# Patient Record
Sex: MALE | Race: NATIVE HAWAIIAN OR OTHER PACIFIC ISLANDER | HISPANIC OR LATINO | URBAN - METROPOLITAN AREA
[De-identification: names, ages, dates, MRNs, and addresses within clinical notes are randomized per-mention and may not be internally consistent; named-entity substitution may affect disease eponyms.]

---

## 2024-04-16 ENCOUNTER — EMERGENCY (EMERGENCY)
Facility: HOSPITAL | Age: 19
LOS: 0 days | Discharge: HOME | End: 2024-04-17
Attending: PEDIATRICS | Admitting: PEDIATRICS
Payer: MEDICAID

## 2024-04-16 VITALS
TEMPERATURE: 99 F | SYSTOLIC BLOOD PRESSURE: 106 MMHG | OXYGEN SATURATION: 98 % | DIASTOLIC BLOOD PRESSURE: 58 MMHG | RESPIRATION RATE: 18 BRPM | HEART RATE: 64 BPM | WEIGHT: 152.12 LBS

## 2024-04-16 DIAGNOSIS — R22.0 LOCALIZED SWELLING, MASS AND LUMP, HEAD: ICD-10-CM

## 2024-04-16 DIAGNOSIS — K08.89 OTHER SPECIFIED DISORDERS OF TEETH AND SUPPORTING STRUCTURES: ICD-10-CM

## 2024-04-16 DIAGNOSIS — H53.8 OTHER VISUAL DISTURBANCES: ICD-10-CM

## 2024-04-16 LAB
ALBUMIN SERPL ELPH-MCNC: 4.7 G/DL — SIGNIFICANT CHANGE UP (ref 3.5–5.2)
ALP SERPL-CCNC: 83 U/L — SIGNIFICANT CHANGE UP (ref 30–115)
ALT FLD-CCNC: 13 U/L — SIGNIFICANT CHANGE UP (ref 13–38)
ANION GAP SERPL CALC-SCNC: 12 MMOL/L — SIGNIFICANT CHANGE UP (ref 7–14)
AST SERPL-CCNC: 17 U/L — SIGNIFICANT CHANGE UP (ref 13–38)
BASOPHILS # BLD AUTO: 0.05 K/UL — SIGNIFICANT CHANGE UP (ref 0–0.2)
BASOPHILS NFR BLD AUTO: 0.8 % — SIGNIFICANT CHANGE UP (ref 0–1)
BILIRUB SERPL-MCNC: 0.9 MG/DL — SIGNIFICANT CHANGE UP (ref 0.2–1.2)
BUN SERPL-MCNC: 11 MG/DL — SIGNIFICANT CHANGE UP (ref 10–20)
CALCIUM SERPL-MCNC: 9.2 MG/DL — SIGNIFICANT CHANGE UP (ref 8.4–10.4)
CHLORIDE SERPL-SCNC: 107 MMOL/L — SIGNIFICANT CHANGE UP (ref 98–110)
CO2 SERPL-SCNC: 22 MMOL/L — SIGNIFICANT CHANGE UP (ref 17–32)
CREAT SERPL-MCNC: 0.6 MG/DL — LOW (ref 0.7–1.5)
EGFR: 142 ML/MIN/1.73M2 — SIGNIFICANT CHANGE UP
EOSINOPHIL # BLD AUTO: 0.19 K/UL — SIGNIFICANT CHANGE UP (ref 0–0.7)
EOSINOPHIL NFR BLD AUTO: 3 % — SIGNIFICANT CHANGE UP (ref 0–8)
GLUCOSE SERPL-MCNC: 84 MG/DL — SIGNIFICANT CHANGE UP (ref 70–99)
HCT VFR BLD CALC: 43.3 % — SIGNIFICANT CHANGE UP (ref 42–52)
HGB BLD-MCNC: 14.7 G/DL — SIGNIFICANT CHANGE UP (ref 14–18)
IMM GRANULOCYTES NFR BLD AUTO: 0.3 % — SIGNIFICANT CHANGE UP (ref 0.1–0.3)
LYMPHOCYTES # BLD AUTO: 2.26 K/UL — SIGNIFICANT CHANGE UP (ref 1.2–3.4)
LYMPHOCYTES # BLD AUTO: 35.5 % — SIGNIFICANT CHANGE UP (ref 20.5–51.1)
MCHC RBC-ENTMCNC: 30.1 PG — SIGNIFICANT CHANGE UP (ref 27–31)
MCHC RBC-ENTMCNC: 33.9 G/DL — SIGNIFICANT CHANGE UP (ref 32–37)
MCV RBC AUTO: 88.7 FL — SIGNIFICANT CHANGE UP (ref 80–94)
MONOCYTES # BLD AUTO: 0.58 K/UL — SIGNIFICANT CHANGE UP (ref 0.1–0.6)
MONOCYTES NFR BLD AUTO: 9.1 % — SIGNIFICANT CHANGE UP (ref 1.7–9.3)
NEUTROPHILS # BLD AUTO: 3.27 K/UL — SIGNIFICANT CHANGE UP (ref 1.4–6.5)
NEUTROPHILS NFR BLD AUTO: 51.3 % — SIGNIFICANT CHANGE UP (ref 42.2–75.2)
NRBC # BLD: 0 /100 WBCS — SIGNIFICANT CHANGE UP (ref 0–0)
PLATELET # BLD AUTO: 216 K/UL — SIGNIFICANT CHANGE UP (ref 130–400)
PMV BLD: 9.7 FL — SIGNIFICANT CHANGE UP (ref 7.4–10.4)
POTASSIUM SERPL-MCNC: 3.9 MMOL/L — SIGNIFICANT CHANGE UP (ref 3.5–5)
POTASSIUM SERPL-SCNC: 3.9 MMOL/L — SIGNIFICANT CHANGE UP (ref 3.5–5)
PROT SERPL-MCNC: 7 G/DL — SIGNIFICANT CHANGE UP (ref 6–8)
RBC # BLD: 4.88 M/UL — SIGNIFICANT CHANGE UP (ref 4.7–6.1)
RBC # FLD: 13.6 % — SIGNIFICANT CHANGE UP (ref 11.5–14.5)
SODIUM SERPL-SCNC: 141 MMOL/L — SIGNIFICANT CHANGE UP (ref 135–146)
WBC # BLD: 6.37 K/UL — SIGNIFICANT CHANGE UP (ref 4.8–10.8)
WBC # FLD AUTO: 6.37 K/UL — SIGNIFICANT CHANGE UP (ref 4.8–10.8)

## 2024-04-16 PROCEDURE — 36415 COLL VENOUS BLD VENIPUNCTURE: CPT

## 2024-04-16 PROCEDURE — D0220: CPT

## 2024-04-16 PROCEDURE — 96374 THER/PROPH/DIAG INJ IV PUSH: CPT | Mod: XU

## 2024-04-16 PROCEDURE — 85025 COMPLETE CBC W/AUTO DIFF WBC: CPT

## 2024-04-16 PROCEDURE — 70487 CT MAXILLOFACIAL W/DYE: CPT | Mod: MC

## 2024-04-16 PROCEDURE — 99284 EMERGENCY DEPT VISIT MOD MDM: CPT

## 2024-04-16 PROCEDURE — T1013: CPT

## 2024-04-16 PROCEDURE — 96376 TX/PRO/DX INJ SAME DRUG ADON: CPT | Mod: XU

## 2024-04-16 PROCEDURE — 96365 THER/PROPH/DIAG IV INF INIT: CPT

## 2024-04-16 PROCEDURE — 96366 THER/PROPH/DIAG IV INF ADDON: CPT

## 2024-04-16 PROCEDURE — D0140: CPT

## 2024-04-16 PROCEDURE — 80053 COMPREHEN METABOLIC PANEL: CPT

## 2024-04-16 RX ADMIN — Medication 100 MILLIGRAM(S): at 22:55

## 2024-04-16 RX ADMIN — Medication 100 MILLIGRAM(S): at 16:26

## 2024-04-16 NOTE — CONSULT NOTE ADULT - SUBJECTIVE AND OBJECTIVE BOX
18M with no significant PMH presenting to ED with 10 days of left lower dental pain, 1 day of left sided facial swelling. Denies changes to vision, odynophagia, dysphagia, and respiratory distress.     Vitals reviewed: stable    Physical Exam:    Gen: NAD, answering questions and following commands appropriately  Head: NC  Eyes: left soft periorbital edema with minimal tenderness on palpation. EOMI. Sclera white. No diplopia. Gross vision intact bilaterally.   Ears: no otorrhea  Nose: no rhinorrhea  Mouth: No upper left caries. No tenderness to percussion on teeth, no tenderness to palpation in buccal vestibule on upper and lower left. Tooth #19 grossly carious to roots - no tenderness to percussion. TTP on left retromolar pad. No trismus. FOM soft bilaterally. Uvula midline. Tongue FROM.   Neck: soft, FROM    Labs: Reviewed  WBC normal    CT Face: Pending    A/P: INCOMPLETE 18M with no significant PMH with 10 days of lower left dental pain, 2/2 pericoronitis #17 vs. carious tooth #19 and 1 day of left periorbital edema. Periorbital edema is not likely odontogenic - no existing tract from lower teeth to periorbital area.     INCOMPLETE  - No acute intervention per OMFS  - antibiotics per ED  - Pain control  - Patient to follow-up outpatient with his own dentist vs. dental clinic on 1st floor  - Page OMFS with any questions or concerns    Don Price   18M with no significant PMH presenting to ED with 10 days of left lower dental pain, 1 day of left sided facial swelling. Denies changes to vision, odynophagia, dysphagia, and respiratory distress.     Vitals reviewed: stable    Physical Exam:    Gen: NAD, answering questions and following commands appropriately  Head: NC  Eyes: left soft periorbital edema with minimal tenderness on palpation. EOMI. Sclera white. No diplopia. Gross vision intact bilaterally.   Ears: no otorrhea  Nose: no rhinorrhea  Mouth: No upper left caries. No tenderness to percussion on teeth, no tenderness to palpation in buccal vestibule on upper and lower left. Tooth #19 grossly carious to roots - no tenderness to percussion. TTP on left retromolar pad. No trismus. FOM soft bilaterally. Uvula midline. Tongue FROM.   Neck: soft, FROM    Labs: Reviewed  WBC normal    CT Face: Mild inflammatory changes in the left periorbital and nasal soft tissue which may reflect preseptal cellulitis. No post septal involvement or drainable fluid collection.    A/P: 18M with no significant PMH with 10 days of lower left dental pain, 2/2 pericoronitis #17 vs. carious tooth #19 and 1 day of left periorbital edema. Periorbital edema is not likely odontogenic - no existing tract from lower teeth to periorbital area.     - No acute intervention per OMFS  - antibiotics per ED  - Pain control  - Patient to follow-up outpatient with his own dentist vs. dental clinic on 1st floor  - Page OMFS with any questions or concerns    Don Price

## 2024-04-16 NOTE — ED PROVIDER NOTE - PHYSICAL EXAMINATION
GENERAL: Well-developed; well-nourished; in no acute distress.  SKIN: warm, well perfused  HEAD: Normocephalic; atraumatic.  EYES: periorbital swelling, no erythema or ttp. no conjunctival erythema, ocular motions intact and appropriate, no pain with eye movement  ENT: No nasal discharge; airway clear. dental carry tooth #19  CARD: Regular rate and rhythm.   RESP: LCTAB; No wheezes or crackles  Upper EXT: Normal ROM. Rad pulses 2+ symm, cap refill <2 secs, sensation intact and symm,  strength 5/5  Lower EXT: strength 5/5, ambulates well independently

## 2024-04-16 NOTE — ED PROVIDER NOTE - ATTENDING CONTRIBUTION TO CARE
I personally evaluated the patient. I reviewed the Resident’s or Physician Assistant’s note (as assigned above), and agree with the findings and plan except as documented in my note.     18-year-old male presents to the ED for evaluation of left-sided facial swelling.  Patient states that toothache began about 10 days ago.  Denies fever.  Patient swelling began today which was worse this morning and has decreased since.    Physical Exam: VS reviewed. Pt is well appearing, in no respiratory distress. MMM. Cap refill <2 seconds. Skin to face with left-sided facial swelling extending to the left lower eyelid.  Mouth with tooth #19 missing filling and impacted wisdom tooth on lower left side. Chest with no retractions, no distress. Neuro exam grossly intact.      Plan: As discussed with dental team, transfer to dental clinic for evaluation.

## 2024-04-16 NOTE — ED PROVIDER NOTE - PROGRESS NOTE DETAILS
Patient returned from dental clinic along with dental team.  As discussed with dental team they do not feel that tooth #19 is responsible for left-sided facial swelling and no procedure was done at the clinic.. Plan discussed with patient.  Will place IV, check labs, do CT maxillofacial and give IV antibiotics while pending results.  Possible admission as discussed with patient. CT results discussed with patient in Citizen of [place].  Will admit for IV antibiotics.

## 2024-04-16 NOTE — CONSULT NOTE ADULT - ASSESSMENT
Patient is a 18y old  Male who presents with a chief complaint of upper left facial swelling     HPI: Patient reports upper left facial/periorbital swelling since this morning. Does not report changes in vision.      PAST MEDICAL & SURGICAL HISTORY:    (   ) heart valve replacement  (   ) joint replacement  (   ) pregnancy    MEDICATIONS  (STANDING):  clindamycin IVPB 600 milliGRAM(s) IV Intermittent every 8 hours    MEDICATIONS  (PRN):      Allergies    No Known Allergies    Intolerances        FAMILY HISTORY:      *SOCIAL HISTORY: (   ) Tobacco; (   ) ETOH    *Last Dental Visit:    Vital Signs Last 24 Hrs  T(C): 37.2 (16 Apr 2024 11:11), Max: 37.2 (16 Apr 2024 11:11)  T(F): 98.9 (16 Apr 2024 11:11), Max: 98.9 (16 Apr 2024 11:11)  HR: 64 (16 Apr 2024 11:11) (64 - 64)  BP: 106/58 (16 Apr 2024 11:11) (106/58 - 106/58)  BP(mean): --  RR: 18 (16 Apr 2024 11:11) (18 - 18)  SpO2: 98% (16 Apr 2024 11:11) (98% - 98%)    Parameters below as of 16 Apr 2024 11:11  Patient On (Oxygen Delivery Method): room air        EOE:  TMJ ( -  ) clicks                     (  - ) pops                     ( -  ) crepitus             Mandible <<FROM>>             Facial bones and MOM <<grossly intact>>             (  - ) trismus             ( -  ) lymphadenopathy             ( +  ) swelling             ( +  ) asymmetry             ( +  ) palpation             ( -  ) dyspnea             (  - ) dysphagia             (  - ) loss of consciousness    IOE:  <<permanent>> dentition:  <<multiple carious teeth>>           hard/soft palate:  (   ) palatal torus, <<No pathology noted>>           tongue/FOM <<No pathology noted>>           labial/buccal mucosa <<No pathology noted>>           (  +  ) percussion           ( +  ) palpation           (  - ) swelling            (  - ) abscess           (  - ) sinus tract      *DENTAL RADIOGRAPHS:    RADIOLOGY & ADDITIONAL STUDIES: Periapical xray #19. Chronic apical periodontitis     *ASSESSMENT:    services utilized: [id number]. Extra orally, left lateral nasal and periorbital swelling sightly firm and tender to percussion. No flushing of the skin. Extra ocular exam was within normal limits. Intra orally, no abscess, swelling or sinus tracts observed anywhere. Tooth #19 has gross occlusal caries and is mildly tender o percussion and palpation. Distal of partially impacted #17 was also tender to palpation, no pericoronitis around #17 established. Patient was recommended antibiotics and an outpatient extraction of #19 by a private dentist or [facility] dental clinic.      *PLAN: Head and neck CT scan to establish the cause for swelling, Extract #19 if odontogenic cause for a swelling is established.     RECOMMENDATIONS:  1) <<Antibiotics per ED  2) Dental F/U with outpatient dentist for comprehensive dental care.   3) If any difficulty swallowing/breathing, fever occur, return to ER.     Resident Name Radha Wright, pager #[phone]

## 2024-04-16 NOTE — ED PEDIATRIC TRIAGE NOTE - CHIEF COMPLAINT QUOTE
Patient ambulatory to the ER, reports increasing swelling to the left side of his face since this AM

## 2024-04-16 NOTE — ED PROVIDER NOTE - CLINICAL SUMMARY MEDICAL DECISION MAKING FREE TEXT BOX
18-year-old male presents to the ED for evaluation of left-sided facial swelling.  Patient states that toothache began about 10 days ago.  Denies fever.  Patient swelling began today which was worse this morning and has decreased since.    Physical Exam: VS reviewed. Pt is well appearing, in no respiratory distress. MMM. Cap refill <2 seconds. Skin to face with left-sided facial swelling extending to the left lower eyelid.  Mouth with tooth #19 missing filling and impacted wisdom tooth on lower left side. Chest with no retractions, no distress. Neuro exam grossly intact.      Plan: As discussed with dental team, transfer to dental clinic for evaluation.

## 2024-04-16 NOTE — ED PROVIDER NOTE - OBJECTIVE STATEMENT
Patient is a 18-year-old male, no past medical history, Syriac-speaking only, comes in for left eye swelling that started today and left lower tooth pain for 10 days.   #[id number] assisted with translation and history taking.  Patient states that he has been having left lower tooth pain for the past 10 days, only has been taking ibuprofen.  Does not have a dentist or PMD, has not been provided any antibiotics.  Today in the morning patient woke up with swelling of the periorbital region of his left eye.  Initially was not able to completely open his eye however throughout the day swelling has decreased.  Blurry vision in the morning however has improved, endorses no pain with eye movement.  Denies fevers, chills, nausea, vomiting, chest pain, abdominal pain, shortness of breath, difficulty eating.

## 2024-04-17 ENCOUNTER — TRANSCRIPTION ENCOUNTER (OUTPATIENT)
Age: 19
End: 2024-04-17

## 2024-04-17 VITALS
SYSTOLIC BLOOD PRESSURE: 114 MMHG | OXYGEN SATURATION: 99 % | HEART RATE: 58 BPM | DIASTOLIC BLOOD PRESSURE: 54 MMHG | RESPIRATION RATE: 18 BRPM

## 2024-04-17 RX ORDER — SODIUM CHLORIDE 9 MG/ML
3 INJECTION INTRAMUSCULAR; INTRAVENOUS; SUBCUTANEOUS EVERY 8 HOURS
Refills: 0 | Status: DISCONTINUED | OUTPATIENT
Start: 2024-04-17 | End: 2024-04-17

## 2024-04-17 RX ORDER — SODIUM CHLORIDE 9 MG/ML
1000 INJECTION, SOLUTION INTRAVENOUS
Refills: 0 | Status: DISCONTINUED | OUTPATIENT
Start: 2024-04-17 | End: 2024-04-17

## 2024-04-17 RX ADMIN — SODIUM CHLORIDE 100 MILLILITER(S): 9 INJECTION, SOLUTION INTRAVENOUS at 03:55

## 2024-04-17 RX ADMIN — SODIUM CHLORIDE 3 MILLILITER(S): 9 INJECTION INTRAMUSCULAR; INTRAVENOUS; SUBCUTANEOUS at 13:19

## 2024-04-17 RX ADMIN — Medication 100 MILLIGRAM(S): at 05:43

## 2024-04-17 RX ADMIN — Medication 100 MILLIGRAM(S): at 15:21

## 2024-04-17 NOTE — DISCHARGE NOTE PROVIDER - HOSPITAL COURSE
One Liner: 17 y/o male with no significant PMH who presents due to left eye and facial swelling x1 day, admitted for treatment of preseptal cellulitis found on CT.    ED Course: Clindamycin x2; CMP, CBC; CT Maxillofacial w/ IV contrast    Inpatient Course (4/16/24-4/17/24):   Pt was admitted to the inpatient floor.   RESP: Patient remained on room air.  CVS: Patient was hemodynamically stable throughout hospital stay.  FENGI: Patient tolerated a regular pediatric diet and received IV fluids.  ID: Patient started on IV Clindamycin, which was switched to PO formulation at time of discharge, for a total treatment regime of 7 days.   DENTAL: Consulted. As per dental, periorbital edema not likely odontogenic . Will follow up with patient as outpatient.     On day of discharge, vitals remained wnl. Patient well-appearing and stable. Care plan, return precautions and anticipatory guidance discussed with parents who endorsed understanding. Patient stable for discharge.    Discharge Vitals:  T(C): 36.7 (17 Apr 2024 07:54), Max: 36.9 (16 Apr 2024 19:52)  T(F): 98.1 (17 Apr 2024 07:54), Max: 98.4 (16 Apr 2024 19:52)  HR: 69 (17 Apr 2024 07:54) (54 - 75)  BP: 96/52 (17 Apr 2024 07:54) (96/52 - 123/70)  BP(mean): 71 (17 Apr 2024 07:54) (71 - 80)  RR: 18 (17 Apr 2024 07:54) (18 - 20)  SpO2: 100% (17 Apr 2024 07:54) (97% - 100%) room air    Physical Exam:  GENERAL: well-appearing, well nourished, no acute distress  HEART: RRR, S1, S2, no rubs, murmurs, or gallops  LUNG: CTAB, no wheezing, rhonchi, or crackles, no retractions, belly breathing, nasal flaring  ABDOMEN: +BS, soft, nontender, nondistended  NEURO: grossly normal. Alert and oriented, no acute change from baseline.   SKIN: good turgor, no rash, no bruising or prominent lesions    Labs and Radiology:              14.7   6.37  )-----------( 216      ( 16 Apr 2024 16:28 )             43.3   04-16    141  |  107  |  11  ----------------------------<  84  3.9   |  22  |  0.6<L>    Ca    9.2      16 Apr 2024 16:28    TPro  7.0  /  Alb  4.7  /  TBili  0.9  /  DBili  x   /  AST  17  /  ALT  13  /  AlkPhos  83  04-16    < from: CT Maxillofacial w/ IV Cont (04.16.24 @ 16:12) >  Impression: Mild inflammatory changes in the left periorbital and nasal soft tissue   which may reflect preseptal cellulitis. No post septal involvement or   drainable fluid collection.    Discharge Plan:  - Follow up with pediatrician in 1-3 days  - Medication Instructions  >    * Please seek medical attention if your child has persistent fever, has difficulty breathing, has a change in mental status, cannot tolerate oral intake, or any other worrying signs or symptoms.     One Liner: 19 y/o male with no significant PMH who presents due to left eye and facial swelling x1 day, admitted for treatment of preseptal cellulitis found on CT.    ED Course: Clindamycin x2; CMP, CBC; CT Maxillofacial w/ IV contrast    Inpatient Course (4/16/24-4/17/24):   Pt was admitted to the inpatient floor.   RESP: Patient remained on room air.  CVS: Patient was hemodynamically stable throughout hospital stay.  FENGI: Patient tolerated a regular pediatric diet and received IV fluids.  ID: Patient started on IV Clindamycin, which was switched to PO formulation at time of discharge, for a total treatment regime of 7 days.   DENTAL: Consulted. As per dental, periorbital edema not likely odontogenic . Will follow up with patient as outpatient.     On day of discharge, vitals remained wnl. Patient well-appearing and stable. Care plan, return precautions and anticipatory guidance discussed with parents who endorsed understanding. Patient stable for discharge.    Discharge Vitals:  T(C): 36.7 (17 Apr 2024 07:54), Max: 36.9 (16 Apr 2024 19:52)  T(F): 98.1 (17 Apr 2024 07:54), Max: 98.4 (16 Apr 2024 19:52)  HR: 69 (17 Apr 2024 07:54) (54 - 75)  BP: 96/52 (17 Apr 2024 07:54) (96/52 - 123/70)  BP(mean): 71 (17 Apr 2024 07:54) (71 - 80)  RR: 18 (17 Apr 2024 07:54) (18 - 20)  SpO2: 100% (17 Apr 2024 07:54) (97% - 100%) room air    Physical Exam:  GENERAL: well-appearing, well nourished, no acute distress, AOx3  HEENT: NCAT, conjunctiva clear and not injected, sclera non-icteric, PERRLA, EACs clear, TMs nonbulging/nonerythematous, nares patent, mucous membranes moist, no mucosal lesions, pharynx nonerythematous, no tonsillar hypertrophy or exudate, neck supple, no cervical lymphadenopathy; (+) swelling to left side of face and left eye; (+) pain to palpation of left side of face, submandibular area, and along jawline; (+) prominent red birthmark extending from right post-auricular area down to right side of neck (present since birth as per patient)   HEART: RRR, S1, S2, no murmurs, RP/DP present, cap refill <2 seconds  LUNG: CTAB, no wheezing, no ronchi, no crackles, no retractions, no belly breathing, no tachypnea  ABDOMEN: +BS, soft, nontender, nondistended  NEURO/MSK: grossly intact    Labs and Radiology:              14.7   6.37  )-----------( 216      ( 16 Apr 2024 16:28 )             43.3   04-16    141  |  107  |  11  ----------------------------<  84  3.9   |  22  |  0.6<L>    Ca    9.2      16 Apr 2024 16:28    TPro  7.0  /  Alb  4.7  /  TBili  0.9  /  DBili  x   /  AST  17  /  ALT  13  /  AlkPhos  83  04-16    < from: CT Maxillofacial w/ IV Cont (04.16.24 @ 16:12) >  Impression: Mild inflammatory changes in the left periorbital and nasal soft tissue   which may reflect preseptal cellulitis. No post septal involvement or   drainable fluid collection.    Discharge Plan:  - Follow up with pediatrician (Dr. Willis) on April 22/24 @1:45 pm   - Follow up with Dental on _____.     - Medication Instructions  > Clindamycin  ______ by mouth every 8 hours for ______ days. ( next dose due at ____)    * Please seek medical attention if your child has persistent fever, has difficulty breathing, has a change in mental status, cannot tolerate oral intake, or any other worrying signs or symptoms.     One Liner: 19 y/o male with no significant PMH who presents due to left eye and facial swelling x1 day, admitted for treatment of preseptal cellulitis found on CT.    ED Course: Clindamycin x2; CMP, CBC; CT Maxillofacial w/ IV contrast    Inpatient Course (4/16/24-4/17/24):   Pt was admitted to the inpatient floor.   RESP: Patient remained on room air.  CVS: Patient was hemodynamically stable throughout hospital stay.  FENGI: Patient tolerated a regular pediatric diet and received IV fluids.  ID: Patient started on IV Clindamycin, which was switched to PO formulation at time of discharge, for a total treatment regime of 7 days.   DENTAL: Consulted. As per dental, periorbital edema not likely odontogenic . Patient to follow up as outpatient.     On day of discharge, vitals remained wnl. Patient well-appearing and stable. Care plan, return precautions and anticipatory guidance discussed with parents who endorsed understanding. Patient stable for discharge.    Discharge Vitals:  T(C): 36.7 (17 Apr 2024 07:54), Max: 36.9 (16 Apr 2024 19:52)  T(F): 98.1 (17 Apr 2024 07:54), Max: 98.4 (16 Apr 2024 19:52)  HR: 69 (17 Apr 2024 07:54) (54 - 75)  BP: 96/52 (17 Apr 2024 07:54) (96/52 - 123/70)  BP(mean): 71 (17 Apr 2024 07:54) (71 - 80)  RR: 18 (17 Apr 2024 07:54) (18 - 20)  SpO2: 100% (17 Apr 2024 07:54) (97% - 100%) room air    Physical Exam:  GENERAL: well-appearing, well nourished, no acute distress, AOx3  HEENT: NCAT, conjunctiva clear and not injected, sclera non-icteric, PERRLA, EACs clear, TMs nonbulging/nonerythematous, nares patent, mucous membranes moist, no mucosal lesions, pharynx nonerythematous, no tonsillar hypertrophy or exudate, neck supple, no cervical lymphadenopathy; (+) swelling to left side of face and left eye; (+) pain to palpation of left side of face, submandibular area, and along jawline; (+) prominent red birthmark extending from right post-auricular area down to right side of neck (present since birth as per patient)   HEART: RRR, S1, S2, no murmurs, RP/DP present, cap refill <2 seconds  LUNG: CTAB, no wheezing, no ronchi, no crackles, no retractions, no belly breathing, no tachypnea  ABDOMEN: +BS, soft, nontender, nondistended  NEURO/MSK: grossly intact    Labs and Radiology:              14.7   6.37  )-----------( 216      ( 16 Apr 2024 16:28 )             43.3   04-16    141  |  107  |  11  ----------------------------<  84  3.9   |  22  |  0.6<L>    Ca    9.2      16 Apr 2024 16:28    TPro  7.0  /  Alb  4.7  /  TBili  0.9  /  DBili  x   /  AST  17  /  ALT  13  /  AlkPhos  83  04-16    < from: CT Maxillofacial w/ IV Cont (04.16.24 @ 16:12) >    Impression: Mild inflammatory changes in the left periorbital and nasal soft tissue   which may reflect preseptal cellulitis. No post septal involvement or   drainable fluid collection.    Discharge Plan:  - Follow up with pediatrician (Dr. Willis) on April 22/24 @1:45 pm   - Follow up with Dental Clinic in 2 weeks (call office to organize appointment)  - Medication Instructions  > Clindamycin 600 mg ( 2 caps) by mouth every 8 hours for 6 days. ( next dose due at 4/17/24 @ 10 pm)    * Please seek medical attention if your child has persistent fever, has difficulty breathing, has a change in mental status, cannot tolerate oral intake, or any other worrying signs or symptoms.     One Liner: 17 y/o male with no significant PMH who presents due to left eye and facial swelling x1 day, admitted for treatment of preseptal cellulitis found on CT.    ED Course: Clindamycin x2; CMP, CBC; CT Maxillofacial w/ IV contrast    Inpatient Course (4/16/24-4/17/24):   Pt was admitted to the inpatient floor.   RESP: Patient remained on room air.  CVS: Patient was hemodynamically stable throughout hospital stay.  FENGI: Patient tolerated a regular pediatric diet and received IV fluids.  ID: Patient started on IV Clindamycin, which was switched to PO formulation at time of discharge, for a total treatment regime of 7 days.   DENTAL: Consulted. As per dental, periorbital edema not likely odontogenic . Patient to follow up as outpatient.     On day of discharge, vitals remained wnl. Patient well-appearing and stable. Care plan, return precautions and anticipatory guidance discussed with parents who endorsed understanding. Patient stable for discharge.    Discharge Vitals:  T(C): 36.7 (17 Apr 2024 07:54), Max: 36.9 (16 Apr 2024 19:52)  T(F): 98.1 (17 Apr 2024 07:54), Max: 98.4 (16 Apr 2024 19:52)  HR: 69 (17 Apr 2024 07:54) (54 - 75)  BP: 96/52 (17 Apr 2024 07:54) (96/52 - 123/70)  BP(mean): 71 (17 Apr 2024 07:54) (71 - 80)  RR: 18 (17 Apr 2024 07:54) (18 - 20)  SpO2: 100% (17 Apr 2024 07:54) (97% - 100%) room air    Physical Exam:  GENERAL: well-appearing, well nourished, no acute distress, AOx3  HEENT: NCAT, conjunctiva clear and not injected, sclera non-icteric, PERRLA, EACs clear, TMs nonbulging/nonerythematous, nares patent, mucous membranes moist, no mucosal lesions, pharynx nonerythematous, no tonsillar hypertrophy or exudate, neck supple, no cervical lymphadenopathy; ( (+) prominent red birthmark extending from right post-auricular area down to right side of neck (present since birth as per patient) , Dental caries left side teeth.  HEART: RRR, S1, S2, no murmurs, RP/DP present, cap refill <2 seconds  LUNG: CTAB, no wheezing, no ronchi, no crackles, no retractions, no belly breathing, no tachypnea  ABDOMEN: +BS, soft, nontender, nondistended  NEURO/MSK: grossly intact    Labs and Radiology:              14.7   6.37  )-----------( 216      ( 16 Apr 2024 16:28 )             43.3   04-16    141  |  107  |  11  ----------------------------<  84  3.9   |  22  |  0.6<L>    Ca    9.2      16 Apr 2024 16:28    TPro  7.0  /  Alb  4.7  /  TBili  0.9  /  DBili  x   /  AST  17  /  ALT  13  /  AlkPhos  83  04-16    < from: CT Maxillofacial w/ IV Cont (04.16.24 @ 16:12) >    Impression: Mild inflammatory changes in the left periorbital and nasal soft tissue   which may reflect preseptal cellulitis. No post septal involvement or   drainable fluid collection.    Discharge Plan:  - Follow up with pediatrician (Dr. Willis) on April 22/24 @1:45 pm   - Follow up with Dental Clinic in 2 weeks (call office to organize appointment)  - Medication Instructions  > Clindamycin 600 mg ( 2 caps) by mouth every 8 hours for 6 days. ( next dose due at 4/17/24 @ 10 pm)    * Please seek medical attention if your child has persistent fever, has difficulty breathing, has a change in mental status, cannot tolerate oral intake, or any other worrying signs or symptoms.

## 2024-04-17 NOTE — H&P PEDIATRIC - HISTORY OF PRESENT ILLNESS
KENYATTA PATEL    HPI:   The patient is a 18 year old male who presents to ED due to inflammation of the face.  As per patient, he woke up this morning and noted inflammation of the left side of his face and his left eye.  He endorses blurry vision and double vision, as well as left-sided facial pain to palpation.  He also endorses dizziness upon standing since presenting to the ED.  He denies pain with eye movement, deficits to extraocular movements, and sensitivity to light.  He denies drainage from the eye.  He denies any aggravating or alleviating factors.  Denies trauma to the area.  Denies recent episodes of sinusitis, cough, throat pain, congestion, ear pain, rashes, nausea, vomiting, diarrhea, and decreased urination.  Denies febrile episodes.  He also denies any sick contacts and recent travel.    Of note, Patient has had left lower dental pain since 10 days ago for which he has been taking ibuprofen.  He has not seen a dentist.  He denies odynophagia, dysphagia, and respiratory distress. However, he endorses decreased PO intake due to the pain.      PMHx: Denies  PSHx: Had surgery on his finger 7 years ago (grafting) due to a cut  Meds: Denies  All: NKDA   FHx: HTN and DM in grandparents  SHx:   HEADSSS:   - Home: Emigrated from [place] 2 years ago; Lives with friends; rents a room in a house  - Education/Employment:   - Activities: Being with friends  - Drugs: Smokes 1-2 cigarettes daily for 2 years; occasionally vapes; drinks alcohol "occasionally"  - Sexuality: Currently sexually active--does not use protection and has never been tested for STIs  - Suicide/Depression: Deferred  - Safety: Deferred  BHx: Does not know birth history  DHx: developmentally appropriate  PMD: Does not have a PMD  Vaccines: Unaware of vaccine history    ED Course:   Clindamycin x2; CMP, CBC; CT Maxillofacial w/ IV contrast    Review of Systems  Constitutional: (-) fever (-) weakness (-) diaphoresis (+) pain  Eyes: (+) change in vision (-) photophobia (-) eye pain  ENT: (-) sore throat (-) ear pain (-) nasal discharge (-) congestion  Cardiovascular: (-) chest pain (-) palpitations  Respiratory: (-) SOB (-) cough (-) WOB   GI: (-) abdominal pain (-) nausea (-) vomiting (-) diarrhea (-) constipation  : (-) dysuria (-) hematuria (-) increased frequency (-) increased urgency  Integumentary: (-) rash (-) redness (-) joint pain (-) MSK pain (-) swelling  Neurological: (-) focal deficit (-) altered mental status (+) dizziness  General: (-) recent travel (-) sick contacts (+) decreased PO (-) urine output     Vital Signs Last 24 Hrs  T(C): 36.7 (16 Apr 2024 23:58), Max: 37.2 (16 Apr 2024 11:11)  T(F): 98.1 (16 Apr 2024 23:58), Max: 98.9 (16 Apr 2024 11:11)  HR: 63 (16 Apr 2024 23:58) (54 - 75)  BP: 111/53 (16 Apr 2024 23:58) (103/51 - 123/70)  BP(mean): 76 (16 Apr 2024 23:58) (73 - 76)  RR: 20 (16 Apr 2024 23:58) (18 - 20)  SpO2: 98% (16 Apr 2024 23:58) (98% - 100%)    Parameters below as of 16 Apr 2024 23:58  Patient On (Oxygen Delivery Method): room air    I&O's Summary    16 Apr 2024 07:01  -  17 Apr 2024 02:35  --------------------------------------------------------  IN: 50 mL / OUT: 0 mL / NET: 50 mL    Drug Dosing Weight    Weight (kg): 69 (16 Apr 2024 11:11)    Physical Exam:  GENERAL: well-appearing, well nourished, no acute distress, AOx3  HEENT: NCAT, conjunctiva clear and not injected, sclera non-icteric, PERRLA, EACs clear, TMs nonbulging/nonerythematous, nares patent, mucous membranes moist, no mucosal lesions, pharynx nonerythematous, no tonsillar hypertrophy or exudate, neck supple, no cervical lymphadenopathy; +swelling to left side of face and left eye; +pain to palpation of left side of face, submandibular area, and along jawline; +prominent red birthmark extending from right post-auricular area down to right side of neck (present since birth as per patient)   HEART: RRR, S1, S2, no rubs, murmurs, or gallops, RP/DP present, cap refill <2 seconds  LUNG: CTAB, no wheezing, no ronchi, no crackles, no retractions, no belly breathing, no tachypnea  ABDOMEN: +BS, soft, nontender, nondistended, no hepatomegaly, no splenomegaly, no hernia  NEURO/MSK: grossly intact  MUSCULOSKELETAL: passive and active ROM intact, 5/5 strength upper and lower extremities  SKIN: good turgor, no rash, no bruising or prominent lesions  BACK: spine normal without deformity or tenderness, no CVA tenderness    Medications:  MEDICATIONS  (STANDING):  clindamycin IVPB 600 milliGRAM(s) IV Intermittent every 8 hours  dextrose 5% + sodium chloride 0.9%. 1000 milliLiter(s) (100 mL/Hr) IV Continuous <Continuous>    MEDICATIONS  (PRN):      Labs:  CBC Full  -  ( 16 Apr 2024 16:28 )  WBC Count : 6.37 K/uL  RBC Count : 4.88 M/uL  Hemoglobin : 14.7 g/dL  Hematocrit : 43.3 %  Platelet Count - Automated : 216 K/uL  Mean Cell Volume : 88.7 fL  Mean Cell Hemoglobin : 30.1 pg  Mean Cell Hemoglobin Concentration : 33.9 g/dL  Auto Neutrophil # : 3.27 K/uL  Auto Lymphocyte # : 2.26 K/uL  Auto Monocyte # : 0.58 K/uL  Auto Eosinophil # : 0.19 K/uL  Auto Basophil # : 0.05 K/uL  Auto Neutrophil % : 51.3 %  Auto Lymphocyte % : 35.5 %  Auto Monocyte % : 9.1 %  Auto Eosinophil % : 3.0 %  Auto Basophil % : 0.8 %      04-16    141  |  107  |  11  ----------------------------<  84  3.9   |  22  |  0.6<L>    Ca    9.2      16 Apr 2024 16:28    TPro  7.0  /  Alb  4.7  /  TBili  0.9  /  DBili  x   /  AST  17  /  ALT  13  /  AlkPhos  83  04-16    LIVER FUNCTIONS - ( 16 Apr 2024 16:28 )  Alb: 4.7 g/dL / Pro: 7.0 g/dL / ALK PHOS: 83 U/L / ALT: 13 U/L / AST: 17 U/L / GGT: x           Urinalysis Basic - ( 16 Apr 2024 16:28 )    Color: x / Appearance: x / SG: x / pH: x  Gluc: 84 mg/dL / Ketone: x  / Bili: x / Urobili: x   Blood: x / Protein: x / Nitrite: x   Leuk Esterase: x / RBC: x / WBC x   Sq Epi: x / Non Sq Epi: x / Bacteria: x        Pending -     Radiology:    Assessment: Vitals currently stable.       Plan:   Resp:  - RA    CVS:  - HDS    FENGI:  - Regular pediatric diet  - D5NS at X cc/hr (M)    ID:   KENYATTA PATEL    HPI:   The patient is a 18 year old male who presents to ED due to inflammation of the face.  As per patient, he woke up this morning and noted inflammation of the left side of his face and his left eye.  He endorses blurry vision and double vision, as well as left-sided facial pain to palpation.  He also endorses dizziness upon standing since presenting to the ED.  He denies pain with eye movement, deficits to extraocular movements, and sensitivity to light.  He denies drainage from the eye.  He denies any aggravating or alleviating factors.  Denies trauma to the area.  Denies recent episodes of sinusitis, cough, throat pain, congestion, ear pain, rashes, nausea, vomiting, diarrhea, and decreased urination.  Denies febrile episodes.  He also denies any sick contacts and recent travel.    Of note, Patient has had left lower dental pain since 10 days ago for which he has been taking ibuprofen.  He has not seen a dentist.  He denies odynophagia, dysphagia, and respiratory distress. However, he endorses decreased PO intake due to the pain.      PMHx: Denies  PSHx: Had surgery on his finger 7 years ago (grafting) due to a cut  Meds: Denies  All: NKDA   FHx: HTN and DM in grandparents  SHx:   HEADSSS:   - Home: Emigrated from [place] 2 years ago; Lives with friends; rents a room in a house  - Education/Employment:   - Activities: Being with friends  - Drugs: Smokes 1-2 cigarettes daily for 2 years; occasionally vapes; drinks alcohol "occasionally"  - Sexuality: Currently sexually active--does not use protection and has never been tested for STIs  - Suicide/Depression: Deferred  - Safety: Deferred  BHx: Does not know birth history  DHx: developmentally appropriate  PMD: Does not have a PMD  Vaccines: Unaware of vaccine history    ED Course:   Clindamycin x2; CMP, CBC; CT Maxillofacial w/ IV contrast    Review of Systems  Constitutional: (-) fever (-) weakness (-) diaphoresis (+) pain  Eyes: (+) change in vision (-) photophobia (-) eye pain  ENT: (-) sore throat (-) ear pain (-) nasal discharge (-) congestion  Cardiovascular: (-) chest pain (-) palpitations  Respiratory: (-) SOB (-) cough (-) WOB   GI: (-) abdominal pain (-) nausea (-) vomiting (-) diarrhea (-) constipation  : (-) dysuria (-) hematuria (-) increased frequency (-) increased urgency  Integumentary: (-) rash (-) redness (-) joint pain (-) MSK pain (-) swelling  Neurological: (-) focal deficit (-) altered mental status (+) dizziness  General: (-) recent travel (-) sick contacts (+) decreased PO (-) urine output     Vital Signs Last 24 Hrs  T(C): 36.7 (16 Apr 2024 23:58), Max: 37.2 (16 Apr 2024 11:11)  T(F): 98.1 (16 Apr 2024 23:58), Max: 98.9 (16 Apr 2024 11:11)  HR: 63 (16 Apr 2024 23:58) (54 - 75)  BP: 111/53 (16 Apr 2024 23:58) (103/51 - 123/70)  BP(mean): 76 (16 Apr 2024 23:58) (73 - 76)  RR: 20 (16 Apr 2024 23:58) (18 - 20)  SpO2: 98% (16 Apr 2024 23:58) (98% - 100%)    Parameters below as of 16 Apr 2024 23:58  Patient On (Oxygen Delivery Method): room air    I&O's Summary    16 Apr 2024 07:01  -  17 Apr 2024 02:35  --------------------------------------------------------  IN: 50 mL / OUT: 0 mL / NET: 50 mL    Drug Dosing Weight    Weight (kg): 69 (16 Apr 2024 11:11)    Physical Exam:  GENERAL: well-appearing, well nourished, no acute distress, AOx3  HEENT: NCAT, conjunctiva clear and not injected, sclera non-icteric, PERRLA, EACs clear, TMs nonbulging/nonerythematous, nares patent, mucous membranes moist, no mucosal lesions, pharynx nonerythematous, no tonsillar hypertrophy or exudate, neck supple, no cervical lymphadenopathy; +swelling to left side of face and left eye; +pain to palpation of left side of face, submandibular area, and along jawline; +prominent red birthmark extending from right post-auricular area down to right side of neck (present since birth as per patient)   HEART: RRR, S1, S2, no rubs, murmurs, or gallops, RP/DP present, cap refill <2 seconds  LUNG: CTAB, no wheezing, no ronchi, no crackles, no retractions, no belly breathing, no tachypnea  ABDOMEN: +BS, soft, nontender, nondistended, no hepatomegaly, no splenomegaly, no hernia  NEURO/MSK: grossly intact  MUSCULOSKELETAL: passive and active ROM intact, 5/5 strength upper and lower extremities  SKIN: good turgor, no rash, no bruising or prominent lesions  BACK: spine normal without deformity or tenderness, no CVA tenderness    Medications:  MEDICATIONS  (STANDING):  clindamycin IVPB 600 milliGRAM(s) IV Intermittent every 8 hours  dextrose 5% + sodium chloride 0.9%. 1000 milliLiter(s) (100 mL/Hr) IV Continuous <Continuous>    MEDICATIONS  (PRN):      Labs:  CBC Full  -  ( 16 Apr 2024 16:28 )  WBC Count : 6.37 K/uL  RBC Count : 4.88 M/uL  Hemoglobin : 14.7 g/dL  Hematocrit : 43.3 %  Platelet Count - Automated : 216 K/uL  Mean Cell Volume : 88.7 fL  Mean Cell Hemoglobin : 30.1 pg  Mean Cell Hemoglobin Concentration : 33.9 g/dL  Auto Neutrophil # : 3.27 K/uL  Auto Lymphocyte # : 2.26 K/uL  Auto Monocyte # : 0.58 K/uL  Auto Eosinophil # : 0.19 K/uL  Auto Basophil # : 0.05 K/uL  Auto Neutrophil % : 51.3 %  Auto Lymphocyte % : 35.5 %  Auto Monocyte % : 9.1 %  Auto Eosinophil % : 3.0 %  Auto Basophil % : 0.8 %      04-16    141  |  107  |  11  ----------------------------<  84  3.9   |  22  |  0.6<L>    Ca    9.2      16 Apr 2024 16:28    TPro  7.0  /  Alb  4.7  /  TBili  0.9  /  DBili  x   /  AST  17  /  ALT  13  /  AlkPhos  83  04-16    LIVER FUNCTIONS - ( 16 Apr 2024 16:28 )  Alb: 4.7 g/dL / Pro: 7.0 g/dL / ALK PHOS: 83 U/L / ALT: 13 U/L / AST: 17 U/L / GGT: x           Urinalysis Basic - ( 16 Apr 2024 16:28 )    Color: x / Appearance: x / SG: x / pH: x  Gluc: 84 mg/dL / Ketone: x  / Bili: x / Urobili: x   Blood: x / Protein: x / Nitrite: x   Leuk Esterase: x / RBC: x / WBC x   Sq Epi: x / Non Sq Epi: x / Bacteria: x    Radiology:  CT maxillofacial: Mild inflammatory changes in the left periorbital and nasal soft tissue   which may reflect preseptal cellulitis. No post septal involvement or   drainable fluid collection.    Assessment:   The patient is a 19 y/o male with no significant PMH who presents due to left eye and facial swelling since this morning.  VSS.  PE remarkable for left-sided periorbital and facial swelling.  Extraoccular movements are intact and there is no pain with eye movement.   With respect to labs, CBC and CMP are within normal limits.  CT maxillofacial indicative of mild inflammatory changes in the left periorbital and nasal soft tissue which may reflect preseptal cellulitis. No post septal involvement or drainable fluid collection.  Thus, very likely preseptal cellulitis, which is an infection of the anterior portion of the eyelid, not involving the orbit or other ocular structures.  Although preseptal and orbital cellulitis may be confused with one another because both can cause ocular pain and eyelid swelling and erythema, they have very different clinical implications. Preseptal cellulitis is generally a mild condition that rarely leads to serious complications, whereas orbital cellulitis may cause loss of vision.  Orbital cellulitis can usually be distinguished from preseptal cellulitis by its clinical features (ophthalmoplegia, pain with eye movements, impaired visual acuity, and proptosis) and by imaging studies.  In this case, the clinical features and imaging studies are more consistent with preseptal cellulitis.  Will treat with IV antibiotics and will provide fluids due to decreased PO intake.  Will continue to monitor clinical status and vital signs.     Plan:   Resp:  - RA    CVS:  - HDS    FENGI:  - Regular pediatric diet  - D5NS at 100 cc/hr (M)    ID:  - Clindamycin 600 mg IV q8h    DENTAL & OMFS:  - Consulted  - As per dental team and OMFS team, periorbital edema is not likely odontogenic - no existing tract from lower teeth to periorbital area.   - To f/u with dental outpatient

## 2024-04-17 NOTE — DISCHARGE NOTE PROVIDER - NSDCCONDITION_GEN_ALL_CORE
Anesthesia confirms case reviewed for anesthesia risk alert. Stable Anesthesia confirms case reviewed for anesthesia risk alert.

## 2024-04-17 NOTE — DISCHARGE NOTE NURSING/CASE MANAGEMENT/SOCIAL WORK - NSDCPEFALRISK_GEN_ALL_CORE
For information on Fall & Injury Prevention, visit: [email or web address] OR  [email or web address] OR  https://www.cdc.gov/steadi/patient.html

## 2024-04-17 NOTE — DISCHARGE NOTE PROVIDER - NSDCCPCAREPLAN_GEN_ALL_CORE_FT
PRINCIPAL DISCHARGE DIAGNOSIS  Diagnosis: Preseptal cellulitis  Assessment and Plan of Treatment: Solicite ayuda de inmediato si:  El rebeca presenta nuevos síntomas.  La visión del rebeca se vuelve borrosa o empeora de algún modo.  El adalberto del rebeca le sobresaliera de la suly (proptosis).  El rebeca tiene los siguientes síntomas:  Síntomas que empeoran o no mejoran con el tratamiento.  Dolor de farhan intenso.  Fiebre.  Rigidez en el abelardo.  Dolor intenso en el abelardo.  Dificultad para  shahid ojos. Por ejemplo, tener dificultad o dolor al mirar en de o más direcciones, o desarrolla visión borrosa.  El rebeca vomita.  El rebeca es lisa de 3 meses y tiene fiebre de 100.4 °F (38 °C) o más.     PRINCIPAL DISCHARGE DIAGNOSIS  Diagnosis: Preseptal cellulitis  Assessment and Plan of Treatment: - Seguimiento con el pediatra (Dr. Willis) el 22/24 de zack a la 1:45 p.m.  - Seguimiento con Dental Clinic en 2 semanas (llame al consultorio para organizar la erick)  - Instrucciones de medicación  > Clindamicina 600 mg (2 cápsulas) por vía oral cada 8 horas bernardino 6 días. (la próxima dosis deberá entregarse el 17/04/24 a las 10 p. m.)  .  * Busque atención médica si carvajal hijo tiene fiebre persistente, dificultad para respirar, un cambio en el estado mental, no puede tolerar la ingesta oral o cualquier otro signo o síntoma preocupante.  .  Solicite ayuda de inmediato si:  El rebeca presenta nuevos síntomas.  La visión del rebeca se vuelve borrosa o empeora de algún modo.  El adalberto del rebeca le sobresaliera de la suly (proptosis).  El rebeca tiene los siguientes síntomas:  Síntomas que empeoran o no mejoran con el tratamiento.  Dolor de farhan intenso.  Fiebre.  Rigidez en el abelardo.  Dolor intenso en el abelardo.  Dificultad para  shahid ojos. Por ejemplo, tener dificultad o dolor al mirar en de o más direcciones, o desarrolla visión borrosa.  El rebeca vomita.  El rebeca es lisa de 3 meses y tiene fiebre de 100.4 °F (38 °C) o más.

## 2024-04-17 NOTE — H&P PEDIATRIC - ATTENDING COMMENTS
Attending:  Pt seen, see resident note for details.    19 y/o male with no significant PMH who presents due to left eye and facial swelling since this morning.  VSS.  PE remarkable for left-sided periorbital and facial swelling.  Extraocular movements are intact and there is no pain with eye movement.  Left Periorbital/ Preseptal cellulitis. No s/s of orbital cellulitis. No proptosis, no blurry vision or diplopia. Pt also has left sided dental caries as per dental and OMFS seen in ED . Pt much improved since admission, can be discharge on PO clindamycin. Adolescent and Dental clinic followup.     I spent >60 minutes in care coordination with multidisciplinary team and updating family and staff.

## 2024-04-17 NOTE — DISCHARGE NOTE PROVIDER - NSDCFUSCHEDAPPT_GEN_ALL_CORE_FT
Dominic Willis  [facility] PreAdmits  Scheduled Appointment: 04/22/2024     Dominic Willis  [facility] PreAdmits  Scheduled Appointment: 04/22/2024    [facility]  PEDADOLESC SI [facility]  Scheduled Appointment: 04/22/2024

## 2024-04-17 NOTE — DISCHARGE NOTE PROVIDER - CARE PROVIDER_API CALL
Chris Min  Oral/Maxillofacial Surgery  [address] [place] [id number]  Phone: [phone]  Fax: [phone]  Follow Up Time: 2 weeks   Chris Min  Oral/Maxillofacial Surgery  [address] [place] [address]  Phone: [phone]  Fax: [phone]  Follow Up Time: 2 weeks    Dominic Willis  Adolescent Medicine  [address] [place] [address]  Phone: [phone]  Fax: [phone]  Scheduled Appointment: 04/22/2024 01:45 PM   Dominic Willis  Adolescent Medicine  [address] [place] [address]  Phone: [phone]  Fax: [phone]  Scheduled Appointment: 04/22/2024 01:45 PM

## 2024-04-17 NOTE — DISCHARGE NOTE PROVIDER - PROVIDER TOKENS
PROVIDER:[TOKEN:[[id number]],FOLLOWUP:[2 weeks]] PROVIDER:[TOKEN:[[id number]],FOLLOWUP:[2 weeks]],PROVIDER:[TOKEN:[[id number]],SCHEDULEDAPPT:[04/22/2024],SCHEDULEDAPPTTIME:[01:45 PM]] PROVIDER:[TOKEN:[[id number]],SCHEDULEDAPPT:[04/22/2024],SCHEDULEDAPPTTIME:[01:45 PM]]

## 2024-04-17 NOTE — DISCHARGE NOTE PROVIDER - CARE PROVIDERS DIRECT ADDRESSES
,DirectAddress_Unknown ,DirectAddress_Unknown,[email or web address]

## 2024-04-17 NOTE — DISCHARGE NOTE PROVIDER - NSFOLLOWUPCLINICS_GEN_ALL_ED_FT
[facility]  Dental  [address] [place] [id number]  Phone: [phone]  Fax:   Follow Up Time: 2 weeks

## 2024-04-17 NOTE — DISCHARGE NOTE NURSING/CASE MANAGEMENT/SOCIAL WORK - PATIENT PORTAL LINK FT
You can access the FollowMyHealth Patient Portal offered by [facility] by registering at the following website: [email or web address]. By joining [employer]’s FollowMyHealth portal, you will also be able to view your health information using other applications (apps) compatible with our system.

## 2024-04-30 ENCOUNTER — APPOINTMENT (OUTPATIENT)
Dept: PEDIATRIC ADOLESCENT MEDICINE | Facility: CLINIC | Age: 19
End: 2024-04-30